# Patient Record
Sex: MALE | Race: WHITE | NOT HISPANIC OR LATINO | Employment: FULL TIME | URBAN - METROPOLITAN AREA
[De-identification: names, ages, dates, MRNs, and addresses within clinical notes are randomized per-mention and may not be internally consistent; named-entity substitution may affect disease eponyms.]

---

## 2018-07-17 ENCOUNTER — TELEPHONE (OUTPATIENT)
Dept: ORTHOPEDICS | Facility: CLINIC | Age: 23
End: 2018-07-17

## 2018-07-17 ENCOUNTER — OFFICE VISIT (OUTPATIENT)
Dept: OCCUPATIONAL MEDICINE | Facility: CLINIC | Age: 23
End: 2018-07-17
Payer: COMMERCIAL

## 2018-07-17 VITALS
RESPIRATION RATE: 16 BRPM | BODY MASS INDEX: 24.44 KG/M2 | HEART RATE: 95 BPM | WEIGHT: 165 LBS | TEMPERATURE: 100 F | OXYGEN SATURATION: 98 % | HEIGHT: 69 IN | DIASTOLIC BLOOD PRESSURE: 89 MMHG | SYSTOLIC BLOOD PRESSURE: 128 MMHG

## 2018-07-17 DIAGNOSIS — R52 PAIN: ICD-10-CM

## 2018-07-17 DIAGNOSIS — S61.213A LACERATION OF LEFT MIDDLE FINGER WITHOUT FOREIGN BODY WITHOUT DAMAGE TO NAIL, INITIAL ENCOUNTER: Primary | ICD-10-CM

## 2018-07-17 PROCEDURE — 64450 NJX AA&/STRD OTHER PN/BRANCH: CPT | Mod: LT,S$GLB,, | Performed by: PHYSICIAN ASSISTANT

## 2018-07-17 PROCEDURE — 99203 OFFICE O/P NEW LOW 30 MIN: CPT | Mod: 25,S$GLB,, | Performed by: FAMILY MEDICINE

## 2018-07-17 RX ORDER — HYDROCODONE BITARTRATE AND ACETAMINOPHEN 5; 325 MG/1; MG/1
1 TABLET ORAL EVERY 6 HOURS PRN
Qty: 5 TABLET | Refills: 0 | Status: SHIPPED | OUTPATIENT
Start: 2018-07-17 | End: 2021-07-04 | Stop reason: ALTCHOICE

## 2018-07-17 RX ORDER — SULFAMETHOXAZOLE AND TRIMETHOPRIM 800; 160 MG/1; MG/1
1 TABLET ORAL 2 TIMES DAILY
Qty: 14 TABLET | Refills: 0 | Status: SHIPPED | OUTPATIENT
Start: 2018-07-17 | End: 2018-07-24

## 2018-07-17 RX ORDER — MUPIROCIN 20 MG/G
OINTMENT TOPICAL 3 TIMES DAILY
Qty: 22 G | Refills: 0 | Status: SHIPPED | OUTPATIENT
Start: 2018-07-17 | End: 2018-07-27

## 2018-07-17 NOTE — TELEPHONE ENCOUNTER
----- Message from Bonita Garner sent at 7/17/2018 12:41 PM CDT -----  Contact: lisa with [facility] [phone]  Nurse advised she spoke with the doctor and wanted to inform him the patient can come tomorrow to the [facility] location. Please call and advise.

## 2018-07-17 NOTE — TELEPHONE ENCOUNTER
Spoke with pt, pt informed appt is WC, informed pt not seeing any documentation in reference to WC. Attempted to contact WC dept, person not there, will contact in the morning. All information given to pt to give to employer so that WC appt could be approved. Understanding verbalized.

## 2018-07-17 NOTE — PROGRESS NOTES
"Subjective:       Patient ID: Mitul Buck is a 22 y.o. male.    Vitals:  height is 5' 9" (1.753 m) and weight is 74.8 kg (165 lb). His temperature is 99.8 °F (37.7 °C). His blood pressure is 128/89 and his pulse is 95. His respiration is 16 and oxygen saturation is 98%.     Chief Complaint: Laceration    Pt states he cut his finger while cutting meat at work.      Laceration    The incident occurred 1 to 3 hours ago. The laceration is located on the left hand.     Review of Systems   Constitution: Negative for chills and fever.   HENT: Negative for sore throat.    Eyes: Negative for blurred vision.   Cardiovascular: Negative for chest pain.   Respiratory: Negative for shortness of breath.    Skin: Negative for rash.   Musculoskeletal: Negative for back pain and joint pain.   Gastrointestinal: Negative for abdominal pain, diarrhea, nausea and vomiting.   Neurological: Negative for headaches.   Psychiatric/Behavioral: The patient is not nervous/anxious.        Objective:      Physical Exam   Constitutional: He is oriented to person, place, and time. He appears well-developed and well-nourished. He is cooperative.  Non-toxic appearance. He does not appear ill. No distress.   HENT:   Head: Normocephalic and atraumatic. Head is without abrasion, without contusion and without laceration.   Right Ear: Hearing, tympanic membrane, external ear and ear canal normal. No hemotympanum.   Left Ear: Hearing, tympanic membrane, external ear and ear canal normal. No hemotympanum.   Nose: Nose normal. No mucosal edema, rhinorrhea or nasal deformity. No epistaxis. Right sinus exhibits no maxillary sinus tenderness and no frontal sinus tenderness. Left sinus exhibits no maxillary sinus tenderness and no frontal sinus tenderness.   Mouth/Throat: Uvula is midline, oropharynx is clear and moist and mucous membranes are normal. No trismus in the jaw. Normal dentition. No uvula swelling. No posterior oropharyngeal erythema.   Eyes: " Conjunctivae, EOM and lids are normal. Pupils are equal, round, and reactive to light. Right eye exhibits no discharge. Left eye exhibits no discharge. No scleral icterus.   Sclera clear bilat   Neck: Trachea normal, normal range of motion, full passive range of motion without pain and phonation normal. Neck supple. No spinous process tenderness and no muscular tenderness present. No neck rigidity. No tracheal deviation present.   Cardiovascular: Normal rate, regular rhythm, normal heart sounds, intact distal pulses and normal pulses.    Pulmonary/Chest: Effort normal and breath sounds normal. No respiratory distress.   Abdominal: Soft. Normal appearance and bowel sounds are normal. He exhibits no distension and no pulsatile midline mass. There is no tenderness.   Musculoskeletal: Normal range of motion. He exhibits no edema or deformity.   Left middle finger avulsion dorsal aspect. Strong flexion and extension of DIP joint against resistance.   Neurological: He is alert and oriented to person, place, and time. He has normal strength. No sensory deficit. He displays no seizure activity. GCS eye subscore is 4. GCS verbal subscore is 5. GCS motor subscore is 6.   Skin: Skin is warm, dry and intact. Capillary refill takes less than 2 seconds. No abrasion, no bruising, no burn, no ecchymosis and no laceration noted. He is not diaphoretic. No pallor.   Psychiatric: He has a normal mood and affect. His speech is normal and behavior is normal. Judgment and thought content normal. Cognition and memory are normal.   Nursing note and vitals reviewed.              Assessment:       1. Laceration of left middle finger without foreign body without damage to nail, initial encounter    2. Pain        Plan:         Laceration of left middle finger without foreign body without damage to nail, initial encounter  -     Ambulatory referral to Orthopedics    Pain  -     X-Ray Hand Complete Left; Future; Expected date:  07/17/2018    Other orders  -     (In Office Administered) Td Vaccine  -     sulfamethoxazole-trimethoprim 800-160mg (BACTRIM DS) 800-160 mg Tab; Take 1 tablet by mouth 2 (two) times daily. for 7 days  Dispense: 14 tablet; Refill: 0  -     mupirocin (BACTROBAN) 2 % ointment; Apply topically 3 (three) times daily. for 10 days  Dispense: 22 g; Refill: 0  -     HYDROcodone-acetaminophen (NORCO) 5-325 mg per tablet; Take 1 tablet by mouth every 6 (six) hours as needed for Pain.  Dispense: 5 tablet; Refill: 0      Xray revealed no bone involvement. Patient able to flex and extend against resistance. Digital block performed and wound soaked in betadine/saline solution and then irrigated copiously. Cleansing dislodged clotting and two arterial bleeds emerged. Tourniquet applied to finger and bleeding controlled with silver nitrate sticks. Mupirocin applied to wound as well as non-stick dressing and then slight compression with coban bandage. I spoke with Dr. Lima who agreed to see the patient either at [facility] tomorrow or [place] Thursday. Patient would prefer to see him at [facility]. We are working with page to expedite this.

## 2018-07-18 PROCEDURE — 90471 IMMUNIZATION ADMIN: CPT | Mod: S$GLB,,, | Performed by: FAMILY MEDICINE

## 2018-07-18 PROCEDURE — 90715 TDAP VACCINE 7 YRS/> IM: CPT | Mod: S$GLB,,, | Performed by: FAMILY MEDICINE

## 2018-07-23 ENCOUNTER — HOSPITAL ENCOUNTER (OUTPATIENT)
Dept: RADIOLOGY | Facility: HOSPITAL | Age: 23
Discharge: HOME | End: 2018-07-23
Attending: ORTHOPAEDIC SURGERY
Payer: COMMERCIAL

## 2018-07-23 ENCOUNTER — OFFICE VISIT (OUTPATIENT)
Dept: ORTHOPEDICS | Facility: CLINIC | Age: 23
End: 2018-07-23
Payer: COMMERCIAL

## 2018-07-23 VITALS — BODY MASS INDEX: 24.44 KG/M2 | WEIGHT: 165 LBS | HEIGHT: 69 IN

## 2018-07-23 DIAGNOSIS — M79.642 LEFT HAND PAIN: ICD-10-CM

## 2018-07-23 DIAGNOSIS — S61.213A LACERATION OF LEFT MIDDLE FINGER WITHOUT FOREIGN BODY WITHOUT DAMAGE TO NAIL, INITIAL ENCOUNTER: ICD-10-CM

## 2018-07-23 DIAGNOSIS — M79.642 LEFT HAND PAIN: Primary | ICD-10-CM

## 2018-07-23 DIAGNOSIS — S61.311A LACERATION OF LEFT INDEX FINGER WITHOUT FOREIGN BODY WITH DAMAGE TO NAIL, INITIAL ENCOUNTER: ICD-10-CM

## 2018-07-23 PROCEDURE — 73130 X-RAY EXAM OF HAND: CPT | Mod: 26,LT,, | Performed by: RADIOLOGY

## 2018-07-23 PROCEDURE — 73130 X-RAY EXAM OF HAND: CPT | Mod: TC,PO,LT

## 2018-07-23 PROCEDURE — 99203 OFFICE O/P NEW LOW 30 MIN: CPT | Mod: S$GLB,,, | Performed by: ORTHOPAEDIC SURGERY

## 2018-07-23 PROCEDURE — 99999 PR PBB SHADOW E&M-EST. PATIENT-LVL III: CPT | Mod: PBBFAC,,, | Performed by: ORTHOPAEDIC SURGERY

## 2018-07-23 NOTE — PROGRESS NOTES
DATE: 7/23/2018  PATIENT: Mitul Buck  REFERRING MD:   CHIEF COMPLAINT:   Chief Complaint   Patient presents with    Left Hand - Pain       HISTORY:  Mitul Buck is a 22 y.o. right hand dominant male  who presents for initial evaluation of left hand injury.  He is employed at [employer] and was cutting knee and cauterize index and long finger of the left hand on a band saw.  He was treated by occupational medicine and given antibiotics.  He states he like to go back to work and is here for release.  He denies any fevers or chills.  Pain is reported at 3/10.  Denies any previous injuries to his hand.    PAST MEDICAL/SURGICAL HISTORY:  History reviewed. No pertinent past medical history.  History reviewed. No pertinent surgical history.    Current Medications:   Current Outpatient Prescriptions:     HYDROcodone-acetaminophen (NORCO) 5-325 mg per tablet, Take 1 tablet by mouth every 6 (six) hours as needed for Pain., Disp: 5 tablet, Rfl: 0    mupirocin (BACTROBAN) 2 % ointment, Apply topically 3 (three) times daily. for 10 days, Disp: 22 g, Rfl: 0    sulfamethoxazole-trimethoprim 800-160mg (BACTRIM DS) 800-160 mg Tab, Take 1 tablet by mouth 2 (two) times daily. for 7 days, Disp: 14 tablet, Rfl: 0    Family History: family history was reviewed and is noncontributory  Social History:   Social History     Social History    Marital status: Single     Spouse name: N/A    Number of children: N/A    Years of education: N/A     Occupational History    Not on file.     Social History Main Topics    Smoking status: Never Smoker    Smokeless tobacco: Never Used    Alcohol use Not on file    Drug use: Unknown    Sexual activity: Not on file     Other Topics Concern    Not on file     Social History Narrative    No narrative on file       ROS:  Constitution: Negative for chills, fever, and sweats. Negative for unexplained weight loss.  HENT: Negative for headaches and blurry vision.   Cardiovascular: Negative  "for chest pain, irregular heartbeat, leg swelling and palpitations.   Respiratory: Negative for cough and shortness of breath.   Gastrointestinal: Negative for abdominal pain, heartburn, nausea and vomiting.   Genitourinary: Negative for bladder incontinence and dysuria.   Musculoskeletal: Negative for systemic arthritis, joint swelling, muscle weakness and myalgias.   Neurological: Negative for numbness.   Psychiatric/Behavioral: Negative for depression.   Endocrine: Negative for polyuria.   Hematologic/Lymphatic: Negative for bleeding disorders.  Skin: Negative for poor wound healing.       PHYSICAL EXAM:  Ht 5' 9" (1.753 m)   Wt 74.8 kg (165 lb)   BMI 24.37 kg/m²   Healthy-appearing male in no acute distress.  Examination left hand reveals a laceration on the dorsum of the long finger over the DIP joint.  There is no exposed tendon.  There is early granulation tissue.  It measures approximately 1 x 1 cm.  Sensation is intact to the radial and ulnar borders.  Extensor tendon function and flexor tendon function are intact.  Examination of the index finger reveals superficial abrasions and partial loss of the nail plate but no injury to the nailbed.      IMAGING:   X-rays of the left hand are personally reviewed.  No acute fractures or dislocations.  No bone loss or bony injury identified.  Soft tissue injury to the dorsum of the DIP joint of the long fingers noted     ASSESSMENT:   Laceration left index and long finger left hand, DOI 7/17/18    PLAN:  The nature of the diagnosis, using models and diagrams when appropriate, was explained to the patient in detail.  I recommended that he continue to complete his course of antibiotics that she has or days.  We discussed local wound care.  He may return to work as long as the wound is dressed and covered while at work should they allow him to do so.  I will see him back in 2 weeks' time for reexam to check the wounds are healed.    This note was dictated using voice " recognition software. Please excuse any grammatical or typographical errors.

## 2018-07-23 NOTE — LETTER
July 23, 2018      Cali Mcgovern MD  [address]  [facility] [address]           [facility]  [address] [facility] [address] [place] [address]  Phone: [phone]          Patient: Mitul Buck   MR Number: [id number]   YOB: 1995   Date of Visit: 7/23/2018       Dear Dr. Cali Mcgovern:    Thank you for referring Mitul Buck to me for evaluation. Attached you will find relevant portions of my assessment and plan of care.    If you have questions, please do not hesitate to call me. I look forward to following Mitul Buck along with you.    Sincerely,    Devin Ritter MD    Enclosure  CC:  No Recipients    If you would like to receive this communication electronically, please contact [email or web address] or [phone] to request more information on [employer] Link access.    For providers and/or their staff who would like to refer a patient to [facility], please contact us through our one-stop-shop provider referral line, [facility], at [phone].    If you feel you have received this communication in error or would no longer like to receive these types of communications, please e-mail [email or web address]